# Patient Record
Sex: MALE
[De-identification: names, ages, dates, MRNs, and addresses within clinical notes are randomized per-mention and may not be internally consistent; named-entity substitution may affect disease eponyms.]

---

## 2020-12-09 ENCOUNTER — APPOINTMENT (OUTPATIENT)
Dept: RHEUMATOLOGY | Facility: CLINIC | Age: 30
End: 2020-12-09
Payer: COMMERCIAL

## 2020-12-09 VITALS
WEIGHT: 141 LBS | TEMPERATURE: 98.5 F | SYSTOLIC BLOOD PRESSURE: 137 MMHG | DIASTOLIC BLOOD PRESSURE: 93 MMHG | BODY MASS INDEX: 22.13 KG/M2 | HEART RATE: 100 BPM | HEIGHT: 67 IN | OXYGEN SATURATION: 96 %

## 2020-12-09 DIAGNOSIS — M25.522 PAIN IN LEFT ELBOW: ICD-10-CM

## 2020-12-09 DIAGNOSIS — M25.531 PAIN IN RIGHT WRIST: ICD-10-CM

## 2020-12-09 DIAGNOSIS — M25.532 PAIN IN LEFT WRIST: ICD-10-CM

## 2020-12-09 DIAGNOSIS — M25.521 PAIN IN RIGHT ELBOW: ICD-10-CM

## 2020-12-09 DIAGNOSIS — Z78.9 OTHER SPECIFIED HEALTH STATUS: ICD-10-CM

## 2020-12-09 PROBLEM — Z00.00 ENCOUNTER FOR PREVENTIVE HEALTH EXAMINATION: Status: ACTIVE | Noted: 2020-12-09

## 2020-12-09 PROCEDURE — 99204 OFFICE O/P NEW MOD 45 MIN: CPT | Mod: 25

## 2020-12-09 PROCEDURE — 99072 ADDL SUPL MATRL&STAF TM PHE: CPT

## 2020-12-09 PROCEDURE — 36415 COLL VENOUS BLD VENIPUNCTURE: CPT

## 2020-12-10 ENCOUNTER — TRANSCRIPTION ENCOUNTER (OUTPATIENT)
Age: 30
End: 2020-12-10

## 2020-12-10 PROBLEM — Z78.9 CONSUMES ALCOHOL OCCASIONALLY: Status: ACTIVE | Noted: 2020-12-09

## 2020-12-10 LAB
CCP AB SER IA-ACNC: 8 UNITS
CRP SERPL-MCNC: 0.12 MG/DL
ERYTHROCYTE [SEDIMENTATION RATE] IN BLOOD BY WESTERGREN METHOD: 7 MM/HR
RF+CCP IGG SER-IMP: NEGATIVE
RHEUMATOID FACT SER QL: <10 IU/ML

## 2020-12-10 NOTE — ASSESSMENT
[FreeTextEntry1] : 30 year old man referred for rheumatologic evaluation.  Patient with history of bilateral wrist and right  lateral epicondylitis for the past six months.  Patient with temporary improvement with physical therapy, steroid injection and NSAIDs but pain recurs following completion of treatments.  Patient works as , spending most of the day working on the computer likely contributing to his symptoms.  Reviewed trial of olecranon strap for epicondylitis as well.  Will evaluate for an underlying inflammatory arthropathy as well, will check ESR, CRP, RF, CCP, and HLA B27 as well.  Patient will follow up with orthopedist regarding MRI findings and ganglion cyst as well.  Further management pending results.

## 2020-12-10 NOTE — REVIEW OF SYSTEMS
[Arthralgias] : arthralgias [Joint Pain] : joint pain [Negative] : Heme/Lymph [Joint Swelling] : no joint swelling [Joint Stiffness] : no joint stiffness [Limb Pain] : no limb pain [Limb Swelling] : no limb swelling [Skin Lesions] : no skin lesions

## 2020-12-10 NOTE — HISTORY OF PRESENT ILLNESS
[FreeTextEntry1] : 30 year old man referred for rheumatologic evaluation.\par Patient with pain in both wrists\par Pain in the right elbow for the past six months\par No MCP, PIP, or DIP  involvement  at this time. \par Seen by orthopedist and completed MRI for both wrists\par Treated with Occupational therapy\par Took NSAIDs as needed for pain\par \par Completed two steroid injections in both wrist, last time about 3-4 months ago\par previous injection, last year in 12/19\par \par First injection helped, the second one not as much\par Sometimes wears a brace on the right wrist which helps\par Right hand dominant\par Works as \par Pain on both elbows as well, right more than left\par No pain in the shoulders or back\par No lower extremity involvement\par \par Recently started running\par Completed OT about one week ago, taking a break\par Helped but pain was still there, only with antiinflammatory felt better\par meloxicam 7.5 mg everyday\par \par No morning stiffness\par As the day goes on feels more symptomatic\par No swelling of the joints\par With pressure feels better\par \par No history of psoriasis\par No family history of arthritis or psoriasis\par \par Does not take medications on regular basis\par No topical medications\par Cold vs hot compresses for pain, both help\par Last month had blood tests for annual exam, results not available to me at this time but reports one liver test elevated at 64, and 's\par Will repeat labs with PMD as well.

## 2020-12-10 NOTE — PHYSICAL EXAM
[General Appearance - Alert] : alert [General Appearance - In No Acute Distress] : in no acute distress [General Appearance - Well-Appearing] : healthy appearing [Sclera] : the sclera and conjunctiva were normal [Respiration, Rhythm And Depth] : normal respiratory rhythm and effort [Exaggerated Use Of Accessory Muscles For Inspiration] : no accessory muscle use [Abnormal Walk] : normal gait [Nail Clubbing] : no clubbing  or cyanosis of the fingernails [Musculoskeletal - Swelling] : no joint swelling seen [] : no rash [Skin Lesions] : no skin lesions [Oriented To Time, Place, And Person] : oriented to person, place, and time [Impaired Insight] : insight and judgment were intact [Affect] : the affect was normal [Mood] : the mood was normal [FreeTextEntry1] : No active synovitis of the upper and lower extremities bilaterally.   No tender of the wrists, MCP or PIP.  Good handgrip strength bilaterally.  Negative Tinels, negative Finkelsteins. +tenderness over the bilateral lateral epicondyle with positive provocative testing, less pronounced on the medial epicondyle.  right more than left.

## 2020-12-10 NOTE — DATA REVIEWED
[FreeTextEntry1] : MRI of the left and right wrists reviewed reports from [facility]\par \par MRI right wrist: Low grade partial thickness tear of the central portion of the triangular fibrocartilage with suspected focal full thickness perforation adjacent to the radial attachment. \par Subcentimeter soft issue ganglion cyst within the dorsum of the wrist, likely arising form the scapholunate interosseous ligament.\par \par MRI left wrist:\par Mild tendinosis, partial thickness tear of the extensor carpi ulnaris tendon.  Mild synovitis adjacent to the dorsal aspect of the ulnar head, nonspecific.

## 2020-12-18 ENCOUNTER — TRANSCRIPTION ENCOUNTER (OUTPATIENT)
Age: 30
End: 2020-12-18

## 2020-12-18 LAB — HLA-B27 RELATED AG QL: NEGATIVE

## 2021-02-05 ENCOUNTER — TRANSCRIPTION ENCOUNTER (OUTPATIENT)
Age: 31
End: 2021-02-05